# Patient Record
Sex: FEMALE | Race: WHITE | NOT HISPANIC OR LATINO | URBAN - METROPOLITAN AREA
[De-identification: names, ages, dates, MRNs, and addresses within clinical notes are randomized per-mention and may not be internally consistent; named-entity substitution may affect disease eponyms.]

---

## 2020-01-16 ENCOUNTER — EMERGENCY (EMERGENCY)
Facility: HOSPITAL | Age: 70
LOS: 0 days | Discharge: HOME | End: 2020-01-16
Attending: EMERGENCY MEDICINE
Payer: COMMERCIAL

## 2020-01-16 VITALS
DIASTOLIC BLOOD PRESSURE: 78 MMHG | TEMPERATURE: 98 F | RESPIRATION RATE: 18 BRPM | OXYGEN SATURATION: 99 % | SYSTOLIC BLOOD PRESSURE: 155 MMHG | HEART RATE: 76 BPM

## 2020-01-16 VITALS — WEIGHT: 110.01 LBS
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
## 2020-01-16 DIAGNOSIS — H57.89 OTHER SPECIFIED DISORDERS OF EYE AND ADNEXA: ICD-10-CM

## 2020-01-16 DIAGNOSIS — S06.0X0A CONCUSSION WITHOUT LOSS OF CONSCIOUSNESS, INITIAL ENCOUNTER: ICD-10-CM

## 2020-01-16 DIAGNOSIS — S00.11XA CONTUSION OF RIGHT EYELID AND PERIOCULAR AREA, INITIAL ENCOUNTER: ICD-10-CM
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
## 2020-01-16 PROCEDURE — 70486 CT MAXILLOFACIAL W/O DYE: CPT

## 2020-01-16 PROCEDURE — 70450 CT HEAD/BRAIN W/O DYE: CPT | Mod: 26

## 2020-01-16 PROCEDURE — 99284 EMERGENCY DEPT VISIT MOD MDM: CPT

## 2020-01-16 PROCEDURE — 70486 CT MAXILLOFACIAL W/O DYE: CPT | Mod: 26

## 2020-01-16 PROCEDURE — 70450 CT HEAD/BRAIN W/O DYE: CPT

## 2020-01-16 PROCEDURE — 76376 3D RENDER W/INTRP POSTPROCES: CPT | Mod: 26

## 2020-01-16 PROCEDURE — 99284 EMERGENCY DEPT VISIT MOD MDM: CPT | Mod: 25

## 2020-01-16 PROCEDURE — 76376 3D RENDER W/INTRP POSTPROCES: CPT

## 2020-01-16 NOTE — ED ADULT NURSE NOTE - CHPI ED NUR SYMPTOMS NEG
no fever/no loss of consciousness/no numbness/no syncope/no chills/no weakness/no vomiting/no bleeding gums

## 2020-01-16 NOTE — ED STATDOCS - ATTENDING CONTRIBUTION TO CARE
I, Dennis Barboza MD,  performed the initial face to face bedside interview with this patient regarding history of present illness, review of symptoms and relevant past medical, social and family history.  I completed an independent physical examination.  I was the initial provider who evaluated this patient. I have signed out the follow up of any pending tests (i.e. labs, radiological studies) to the ACP.  I have communicated the patient’s plan of care and disposition with the ACP.  The history, relevant review of systems, past medical and surgical history, medical decision making, and physical examination was documented by the scribe in my presence and I attest to the accuracy of the documentation.

## 2020-01-16 NOTE — ED STATDOCS - PATIENT PORTAL LINK FT
You can access the FollowMyHealth Patient Portal offered by [facility] by registering at the following website: [email or web address]. By joining [facility]’s FollowMyHealth portal, you will also be able to view your health information using other applications (apps) compatible with our system.

## 2020-01-16 NOTE — ED STATDOCS - PROGRESS NOTE DETAILS
pt reeval and states he hsas pain to his right eye after a fall 2 days ago. pt denies any headache, eye pain with movement or blurry vision. plan: ct, and reeval. -Ailyn Avila PA-C pt reeval and states he hsas pain to his right eye after a fall 2 days ago. pt denies any headache, eye pain with movement or blurry vision. EYE: (+) ecchymosis and swelling to superior and inferior periorbital, visual accuity 20/20 b/l with glasses on 20/20 right and 20/20 L  plan: ct, and reeval. -Ailyn Avila PA-C pt reeval and states he hsas pain to his right eye after a fall 2 days ago. pt denies any headache, eye pain with movement or blurry vision. EYE: (+) ecchymosis and swelling to superior and inferior periorbital, visual accuity 20/20 b/l with glasses on 20/20 right and 20/20 L, EOMI, no coreneal abrasion or fb, IOP right eye is 9.  plan: ct, and reeval. -Ailyn Avila PA-C pt aware of imaging results and states she feels better. pt to fu with opth and pmd and given instructions about concussion and when to return to ed. pt agrees with plan and well appearing on dc. -Ailyn Avila PA-C

## 2020-01-16 NOTE — ED STATDOCS - CARE PLAN
Principal Discharge DX:	Concussion without loss of consciousness, initial encounter  Secondary Diagnosis:	Hematoma

## 2020-01-16 NOTE — ED ADULT TRIAGE NOTE - CHIEF COMPLAINT QUOTE
pt presents to ED due to eye injury with worsening pain pt states she fell on tuesday with a hematoma

## 2020-01-16 NOTE — ED ADULT NURSE NOTE - OBJECTIVE STATEMENT
pt is a 70 y/o female presenting to ED for eval of right eye pain and discomfort since 2 days ago after trip and fall. denies dizziness HA or weakness

## 2020-01-16 NOTE — ED STATDOCS - CLINICAL SUMMARY MEDICAL DECISION MAKING FREE TEXT BOX
Pt s/p fall x2 days ago with injury to right forehead, now with bruising around right eye, plan check CTs.

## 2020-01-16 NOTE — ED STATDOCS - OBJECTIVE STATEMENT
68 y/o f with no PMHx presenting to the ED c/o redness/pain to right eye. Pt reports falling x2 days ago, hitting right eye. States after fall she rested and iced hematoma but today noticed she felt "fuzzy" and eye became more red. Denies fever, chills, any other acute c/o.

## 2020-01-16 NOTE — ED STATDOCS - NSFOLLOWUPINSTRUCTIONS_ED_ALL_ED_FT
Headache    A headache is pain or discomfort felt around the head or neck area. The specific cause of a headache may not be found as there are many types including tension headaches, migraine headaches, and cluster headaches. Watch your condition for any changes. Things you can do to manage your pain include taking over the counter and prescription medications as instructed by your health care provider, lying down in a dark quiet room, limiting stress, getting regular sleep, and refraining from alcohol and tobacco products.    SEEK IMMEDIATE MEDICAL CARE IF YOU HAVE ANY OF THE FOLLOWING SYMPTOMS: fever, vomiting, stiff neck, loss of vision, problems with speech, muscle weakness, loss of balance, trouble walking, passing out, or confusion. Headache    A headache is pain or discomfort felt around the head or neck area. The specific cause of a headache may not be found as there are many types including tension headaches, migraine headaches, and cluster headaches. Watch your condition for any changes. Things you can do to manage your pain include taking over the counter and prescription medications as instructed by your health care provider, lying down in a dark quiet room, limiting stress, getting regular sleep, and refraining from alcohol and tobacco products.    SEEK IMMEDIATE MEDICAL CARE IF YOU HAVE ANY OF THE FOLLOWING SYMPTOMS: fever, vomiting, stiff neck, loss of vision, problems with speech, muscle weakness, loss of balance, trouble walking, passing out, or confusion.    Arab[place]uel[place] FrenchAdventHealth Castle RocklishPortHarmon [facility] – HollisianSpanish[place]namese    Subconjunctival Hemorrhage  Image   Subconjunctival hemorrhage is bleeding that happens between the white part of your eye (sclera) and the clear membrane that covers the outside of your eye (conjunctiva). There are many tiny blood vessels near the surface of your eye. A subconjunctival hemorrhage happens when one or more of these vessels breaks and bleeds, causing a red patch to appear on your eye. This is similar to a bruise.  Depending on the amount of bleeding, the red patch may only cover a small area of your eye or it may cover the entire visible part of the sclera. If a lot of blood collects under the conjunctiva, there may also be swelling. Subconjunctival hemorrhages do not affect your vision or cause pain, but your eye may feel irritated if there is swelling. Subconjunctival hemorrhages usually do not require treatment, and they disappear on their own within two weeks.  What are the causes?  This condition may be caused by:  Mild trauma, such as rubbing your eye too hard.Severe trauma or blunt injuries.Coughing, sneezing, or vomiting.Straining, such as when lifting a heavy object.High blood pressure.Recent eye surgery.A history of diabetes.Certain medicines, especially blood thinners (anticoagulants).Other conditions, such as eye tumors, bleeding disorders, or blood vessel abnormalities.Subconjunctival hemorrhages can happen without an obvious cause.  What are the signs or symptoms?  Symptoms of this condition include:  A bright red or dark red patch on the white part of the eye.  The red area may spread out to cover a larger area of the eye before it goes away.The red area may turn brownish-yellow before it goes away.Swelling.Mild eye irritation.How is this diagnosed?  This condition is diagnosed with a physical exam. If your subconjunctival hemorrhage was caused by trauma, your health care provider may refer you to an eye specialist (ophthalmologist) or another specialist to check for other injuries. You may have other tests, including:  An eye exam.A blood pressure check.Blood tests to check for bleeding disorders.If your subconjunctival hemorrhage was caused by trauma, X-rays or a CT scan may be done to check for other injuries.  How is this treated?  Usually, no treatment is needed. Your health care provider may recommend eye drops or cold compresses to help with discomfort.  Follow these instructions at home:  Take over-the-counter and prescription medicines only as directed by your health care provider.Use eye drops or cold compresses to help with discomfort as directed by your health care provider.Avoid activities, things, and environments that may irritate or injure your eye.Keep all follow-up visits as told by your health care provider. This is important.Contact a health care provider if:  You have pain in your eye.The bleeding does not go away within 3 weeks.You keep getting new subconjunctival hemorrhages.Get help right away if:  Your vision changes or you have difficulty seeing.You suddenly develop severe sensitivity to light.You develop a severe headache, persistent vomiting, confusion, or abnormal tiredness (lethargy).Your eye seems to bulge or protrude from your eye socket.You develop unexplained bruises on your body.You have unexplained bleeding in another area of your body.This information is not intended to replace advice given to you by your health care provider. Make sure you discuss any questions you have with your health care provider.

## 2020-01-16 NOTE — ED STATDOCS - NSFOLLOWUPCLINICS_GEN_ALL_ED_FT
[facility]hthal[facility]  Ophthalmology  [address] [place] [address] [address]  [place] [address]  Phone: [phone]  Fax:   Follow Up Time:

## 2023-03-15 NOTE — ED ADULT NURSE NOTE - NSSUHOSCREENINGYN_ED_ALL_ED
Patient denied PCP notification of admission to [facility] in [place] Date: 3/15/2023 Time 0518      
Yes - the patient is able to be screened